# Patient Record
Sex: FEMALE | Race: AMERICAN INDIAN OR ALASKA NATIVE
[De-identification: names, ages, dates, MRNs, and addresses within clinical notes are randomized per-mention and may not be internally consistent; named-entity substitution may affect disease eponyms.]

---

## 2017-04-20 ENCOUNTER — HOSPITAL ENCOUNTER (EMERGENCY)
Dept: HOSPITAL 5 - ED | Age: 32
Discharge: HOME | End: 2017-04-20
Payer: SELF-PAY

## 2017-04-20 VITALS — DIASTOLIC BLOOD PRESSURE: 88 MMHG | SYSTOLIC BLOOD PRESSURE: 127 MMHG

## 2017-04-20 DIAGNOSIS — M54.5: Primary | ICD-10-CM

## 2017-04-20 DIAGNOSIS — R10.9: ICD-10-CM

## 2017-04-20 LAB
ALBUMIN SERPL-MCNC: 4.1 G/DL (ref 3.9–5)
ALBUMIN/GLOB SERPL: 1.3 %
ALP SERPL-CCNC: 60 UNITS/L (ref 35–129)
ALT SERPL-CCNC: 11 UNITS/L (ref 7–56)
ANION GAP SERPL CALC-SCNC: 16 MMOL/L
BASOPHILS NFR BLD AUTO: 0.7 % (ref 0–1.8)
BILIRUB SERPL-MCNC: 0.3 MG/DL (ref 0.1–1.2)
BILIRUB UR QL STRIP: (no result)
BLOOD UR QL VISUAL: (no result)
BUN SERPL-MCNC: 12 MG/DL (ref 7–17)
BUN/CREAT SERPL: 13.33 %
CALCIUM SERPL-MCNC: 9 MG/DL (ref 8.4–10.2)
CHLORIDE SERPL-SCNC: 98.4 MMOL/L (ref 98–107)
CO2 SERPL-SCNC: 25 MMOL/L (ref 22–30)
EOSINOPHIL NFR BLD AUTO: 3.4 % (ref 0–4.3)
GLUCOSE SERPL-MCNC: 92 MG/DL (ref 65–100)
HCT VFR BLD CALC: 36.5 % (ref 30.3–42.9)
HGB BLD-MCNC: 12.2 GM/DL (ref 10.1–14.3)
KETONES UR STRIP-MCNC: (no result) MG/DL
LEUKOCYTE ESTERASE UR QL STRIP: (no result)
LIPASE SERPL-CCNC: 20 UNITS/L (ref 13–60)
MCH RBC QN AUTO: 32 PG (ref 28–32)
MCHC RBC AUTO-ENTMCNC: 33 % (ref 30–34)
MCV RBC AUTO: 96 FL (ref 79–97)
NITRITE UR QL STRIP: (no result)
PH UR STRIP: 8 [PH] (ref 5–7)
PLATELET # BLD: 173 K/MM3 (ref 140–440)
POTASSIUM SERPL-SCNC: 4.4 MMOL/L (ref 3.6–5)
PROT SERPL-MCNC: 7.2 G/DL (ref 6.3–8.2)
PROT UR STRIP-MCNC: (no result) MG/DL
RBC # BLD AUTO: 3.82 M/MM3 (ref 3.65–5.03)
RBC #/AREA URNS HPF: 3 /HPF (ref 0–6)
SODIUM SERPL-SCNC: 135 MMOL/L (ref 137–145)
UROBILINOGEN UR-MCNC: < 2 MG/DL (ref ?–2)
WBC # BLD AUTO: 6.7 K/MM3 (ref 4.5–11)
WBC #/AREA URNS HPF: < 1 /HPF (ref 0–6)

## 2017-04-20 PROCEDURE — 81001 URINALYSIS AUTO W/SCOPE: CPT

## 2017-04-20 PROCEDURE — 83690 ASSAY OF LIPASE: CPT

## 2017-04-20 PROCEDURE — 81025 URINE PREGNANCY TEST: CPT

## 2017-04-20 PROCEDURE — 80053 COMPREHEN METABOLIC PANEL: CPT

## 2017-04-20 PROCEDURE — 85025 COMPLETE CBC W/AUTO DIFF WBC: CPT

## 2017-04-20 PROCEDURE — 36415 COLL VENOUS BLD VENIPUNCTURE: CPT

## 2017-04-20 PROCEDURE — 99283 EMERGENCY DEPT VISIT LOW MDM: CPT

## 2017-04-20 NOTE — EMERGENCY DEPARTMENT REPORT
ED Abdominal Pain HPI





- General


Chief Complaint: Back Pain/Injury


Stated Complaint: LOWER BACK PAIN


Time Seen by Provider: 17 21:55


Source: patient


Mode of arrival: Ambulatory


Limitations: No Limitations





- History of Present Illness


Initial Comments: 





She is a 31-year-old female with no past medical history presenting with lower 

back pain greater than one month.  Patient reports 4 weeks ago she noticed 

increasing soreness in the lower back.  Patient describes the pain as 

intermittent, dull, ache.  Associated right lower quadrant pain at times.  

Currently patient denies any abdominal pain.  Patient denies any inciting 

injury.  Otherwise no fevers, chills, nausea, vomiting, chest pain, shortness 

of breath, trauma, travel,  sick contacts, difficulty with gait, urinary or 

bowel incontinence.


MD Complaint: abdominal pain, other (back pain)





- Related Data


 Previous Rx's











 Medication  Instructions  Recorded  Last Taken  Type


 


Naproxen [Naprosyn] 500 mg PO BID PRN #14 tablet 17 Unknown Rx











 Allergies











Allergy/AdvReac Type Severity Reaction Status Date / Time


 


No Known Allergies Allergy   Unverified 10/06/13 18:11














ED Review of Systems


ROS: 


Stated complaint: LOWER BACK PAIN


Other details as noted in HPI





Comment: All other systems reviewed and negative





ED Past Medical Hx





- Past Medical History


Previous Medical History?: No





- Surgical History


Past Surgical History?: Yes


Additional Surgical History:  





- Social History


Smoking Status: Former Smoker


Substance Use Type: None





- Medications


Home Medications: 


 Home Medications











 Medication  Instructions  Recorded  Confirmed  Last Taken  Type


 


Naproxen [Naprosyn] 500 mg PO BID PRN #14 tablet 17  Unknown Rx














ED Physical Exam





- General


Limitations: No Limitations


General appearance: alert, in no apparent distress





- Head


Head exam: Present: atraumatic, normocephalic





- Eye


Eye exam: Present: normal appearance





- ENT


ENT exam: Present: mucous membranes moist





- Neck


Neck exam: Present: normal inspection





- Respiratory


Respiratory exam: Present: normal lung sounds bilaterally.  Absent: respiratory 

distress





- Cardiovascular


Cardiovascular Exam: Present: regular rate, normal rhythm.  Absent: systolic 

murmur, diastolic murmur, rubs, gallop





- GI/Abdominal


GI/Abdominal exam: Present: soft, normal bowel sounds





- Extremities Exam


Extremities exam: Present: normal inspection





- Back Exam


Back exam: Present: normal inspection, full ROM.  Absent: tenderness, CVA 

tenderness (R), muscle spasm, paraspinal tenderness, vertebral tenderness





- Neurological Exam


Neurological exam: Present: alert, oriented X3, CN II-XII intact, normal gait.  

Absent: motor sensory deficit





- Psychiatric


Psychiatric exam: Present: normal affect, normal mood





- Skin


Skin exam: Present: warm, dry, intact, normal color.  Absent: rash





ED Course


 Vital Signs











  17





  17:54 22:12 22:13


 


Temperature 98.8 F 98 F 


 


Pulse Rate 87 80 


 


Respiratory 18 18 





Rate   


 


Blood Pressure 113/76  


 


Blood Pressure  108/60 





[Left]   


 


O2 Sat by Pulse 100 98 100





Oximetry   














ED Medical Decision Making





- Lab Data


Result diagrams: 


 17 18:02





 17 18:02


Critical care attestation.: 


If time is entered above; I have spent that time in minutes in the direct care 

of this critically ill patient, excluding procedure time.








ED Disposition


Clinical Impression: 


 Back pain, Abdominal pain





Disposition: DISCHARGED TO HOME OR SELFCARE


Is pt being admited?: No


Condition: Stable


Instructions:  Abdominal Pain (ED), Low Back Strain (ED)


Prescriptions: 


Naproxen [Naprosyn] 500 mg PO BID PRN #14 tablet


 PRN Reason: Pain


Referrals: 


PRIMARY CARE,MD [Primary Care Provider] - 3-5 Days

## 2018-05-13 ENCOUNTER — HOSPITAL ENCOUNTER (EMERGENCY)
Dept: HOSPITAL 5 - ED | Age: 33
Discharge: HOME | End: 2018-05-13
Payer: COMMERCIAL

## 2018-05-13 VITALS — DIASTOLIC BLOOD PRESSURE: 75 MMHG | SYSTOLIC BLOOD PRESSURE: 110 MMHG

## 2018-05-13 DIAGNOSIS — S61.216A: Primary | ICD-10-CM
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
## 2018-05-13 DIAGNOSIS — F17.200: ICD-10-CM
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
## 2018-05-13 DIAGNOSIS — S91.011A: ICD-10-CM
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
## 2018-05-13 NOTE — XRAY REPORT
FINAL REPORT



PROCEDURE:  XR FINGER(S) 2+V RT



TECHNIQUE:  RIGHT hand radiographs, AP, lateral, and oblique

views. CPT [id number]







HISTORY:  s/p fall r/o glass in finger 



COMPARISON:  No prior studies are available for comparison.



FINDINGS:  

Fracture (s) and/or Dislocation(s): None .



Alignment: Normal .



Joint space(s): Normal .



Soft tissues: Normal .



Bone mineralization: Normal .



Foreign bodies: None .







IMPRESSION:  

Normal Examination .

## 2018-05-13 NOTE — XRAY REPORT
FINAL REPORT



PROCEDURE:  XR TIBIA FIBULA 2V RT



TECHNIQUE:  RIGHT tibia and fibula radiographs, AP and lateral

views. CPT 79467







HISTORY:  S/P fall R/O glass in wounds 



COMPARISON:  No prior studies are available for comparison.



FINDINGS:  

Fracture (s) and/or Dislocation(s): None .



Joint space(s): Normal .



Soft tissues: Normal .



Bone mineralization: Normal .



Foreign bodies: None .







IMPRESSION:  

Normal Examination.

## 2018-05-13 NOTE — EMERGENCY DEPARTMENT REPORT
- General


Chief Complaint: Laceration/Recheck/Suture


Stated Complaint: RIGHT LEG LACERATION


Time Seen by Provider: 18 03:13


Source: patient


Mode of arrival: Ambulatory


Limitations: No Limitations





- History of Present Illness


Initial Comments: 





32-year-old -American female comes in status post fall in the shower 

glass broken.  Patient reports multiple cuts to her right lower extremity and 

right pinkie.  Patient reports that she is up-to-date in her tetanus has no 

past medical history currently takes prenatal vitamins for prophylactic 

possibility of trying to get pregnant.  No known drug allergies.  She is here 

with her mother and her sister.


-: During the night


Extremity Location: Right: Lower Leg, Ankle


Place: home


Patient Tetanus UTD: Yes


Context: accidental


Associated Symptoms: none





- Related Data


 Previous Rx's











 Medication  Instructions  Recorded  Last Taken  Type


 


Naproxen [Naprosyn TAB] 500 mg PO BID PRN #14 tablet 18 Unknown Rx


 


Sulfamethoxazole/Trimethoprim 1 each PO BID #20 tablet 18 Unknown Rx





[Bactrim Ds Tablet]    











 Allergies











Allergy/AdvReac Type Severity Reaction Status Date / Time


 


No Known Allergies Allergy   Unverified 10/06/13 18:11














ED Review of Systems


ROS: 


Stated complaint: RIGHT LEG LACERATION


Other details as noted in HPI





Comment: All other systems reviewed and negative


Genitourinary: denies: urgency, dysuria, discharge


Musculoskeletal: denies: back pain, joint swelling, arthralgia


Skin: other (multiple abrasion laceration on right finger and her right ankle)


Neurological: denies: headache, weakness, paresthesias


Psychiatric: denies: anxiety, depression


Hematological/Lymphatic: denies: easy bleeding, easy bruising





ED Past Medical Hx





- Past Medical History


Previous Medical History?: No





- Surgical History


Past Surgical History?: Yes


Additional Surgical History:  





- Social History


Smoking Status: Current Every Day Smoker


Substance Use Type: Alcohol





- Medications


Home Medications: 


 Home Medications











 Medication  Instructions  Recorded  Confirmed  Last Taken  Type


 


Naproxen [Naprosyn TAB] 500 mg PO BID PRN #14 tablet 18  Unknown Rx


 


Sulfamethoxazole/Trimethoprim 1 each PO BID #20 tablet 18  Unknown Rx





[Bactrim Ds Tablet]     














ED Physical Exam





- General


Limitations: No Limitations


General appearance: alert, in no apparent distress





- Head


Head exam: Present: atraumatic, normocephalic





- Eye


Eye exam: Present: normal appearance





- ENT


ENT exam: Present: mucous membranes moist





- Cardiovascular


Cardiovascular Exam: Present: regular rate





- Neurological Exam


Neurological exam: Present: alert, oriented X3





- Psychiatric


Psychiatric exam: Present: normal affect, normal mood





- Skin


Skin exam: Present: abrasion (multiple foreign right leg and lower leg,), other 

(2 cm laceration to the right lateral malleolus that is jagged with a flap, 1 

cm laceration to the right PIP of the fifth digit)





ED Course


 Vital Signs











  18





  02:04


 


Temperature 98.5 F


 


Pulse Rate 100 H


 


Respiratory 20





Rate 


 


Blood Pressure 110/75


 


O2 Sat by Pulse 97





Oximetry 














- Laceration /Wound Repair


  ** Right Finger


Wound Location: upper extremity (1 cm laceration to the fifth DIP right hand), 

lower extremity (2 cm laceration with a flap and irregular to the right lateral 

malleolus)


Wound's Depth, Shape: superficial


Wound Explored: clean


Irrigated w/ Saline (ccs): 500


Betadine Prep?: Yes (500 mL but soak, finger soak 50 mL with Betadine)


Anesthesia: 1% Lidocaine


Volume Anesthetic (ccs): 5 (for both lacerations)


Wound Debrided: moderate


Wound Repaired With: sutures


Suture Size/Type: 5:0, proline


Number of Sutures: 6 (4 to the right fifth digit due to the right malleolus 

ankle)


Layer Closure?: No


Sterile Dressing Applied?: Yes


Progress: 





Patient tolerated procedure well





ED Medical Decision Making





- Radiology Data


Radiology results: report reviewed, image reviewed





FINDINGS: 


 Fracture (s) and/or Dislocation(s): None . 





 Alignment: Normal . 





 Joint space(s): Normal . 





 Soft tissues: Normal . 





 Bone mineralization: Normal . 





 Foreign bodies: None . 











 IMPRESSION: 


 Normal Examination . 











 Transcribed By: [facility] 


 Dictated By: FLACO SR MD 


 Electronically Authenticated By: FLACO SR MD 


 Signed Date/Time: 18 











 DD/DT: 18 


 TD/TT: 18 











FINAL REPORT 





 PROCEDURE: XR TIBIA FIBULA 2V RT 





 TECHNIQUE: RIGHT tibia and fibula radiographs, AP and lateral 


 views. CPT 03938 











 HISTORY: S/P fall R/O glass in wounds 





 COMPARISON: No prior studies are available for comparison. 





 FINDINGS: 


 Fracture (s) and/or Dislocation(s): None . 





 Joint space(s): Normal . 





 Soft tissues: Normal . 





 Bone mineralization: Normal . 





 Foreign bodies: None . 











 IMPRESSION: 


 Normal Examination. 











 Transcribed By: [facility] 


 Dictated By: FLACO SR MD 


 Electronically Authenticated By: FLACO SR MD 


 Signed Date/Time: 18 











 DD/DT: 18 


 TD/TT: 18 





- Medical Decision Making





Patient has been evaluated by this provider fast track.  Patient had right 

finger sutured and right ankle sutured.  Discussed the patient she needs to 

return back to the emergency room in 7-10 days for suture removal.  Discussed 

with patient to continue with the antibiotics that I have prescribed and pain 

medication as prescribed.  She is to return sooner if there is any signs of 

infection such as purulent discharge swelling redness.  Please change her 

dressings daily.  He area clean and dry.


Critical care attestation.: 


If time is entered above; I have spent that time in minutes in the direct care 

of this critically ill patient, excluding procedure time.








ED Disposition


Clinical Impression: 


Laceration of right ankle


Qualifiers:


 Encounter type: initial encounter Qualified Code(s): S91.011A - Laceration 

without foreign body, right ankle, initial encounter





Laceration of right little finger


Qualifiers:


 Encounter type: initial encounter Damage to nail status: without damage 

Foreign body presence: without foreign body Qualified Code(s): S61.216A - 

Laceration without foreign body of right little finger without damage to nail, 

initial encounter





Abrasion of multiple sites of right lower extremity


Qualifiers:


 Encounter type: initial encounter Qualified Code(s): S80.811A - Abrasion, 

right lower leg, initial encounter





Disposition: - TO HOME OR SELFCARE


Is pt being admited?: No


Does the pt Need Aspirin: No


Condition: Stable


Instructions:  Suture Care (ED), Laceration (ED), Skin Adhesive Care (ED)


Additional Instructions: 


Please keep sutures clean and dry.  Please complete antibiotics as prescribed.  

Please follow up for suture removal in 7-10 days.  You can take naproxen for 

pain.


Prescriptions: 


Naproxen [Naprosyn TAB] 500 mg PO BID PRN #14 tablet


 PRN Reason: Pain


Sulfamethoxazole/Trimethoprim [Bactrim Ds Tablet] 1 each PO BID #20 tablet


Referrals: 


LUIZ RODRIGUEZ MD [Primary Care Provider] - 3-5 Days


Forms:  Work/School Release Form(ED), Accompanied Note

## 2018-05-16 ENCOUNTER — HOSPITAL ENCOUNTER (EMERGENCY)
Dept: HOSPITAL 5 - ED | Age: 33
Discharge: HOME | End: 2018-05-16
Payer: COMMERCIAL

## 2018-05-16 VITALS — DIASTOLIC BLOOD PRESSURE: 76 MMHG | SYSTOLIC BLOOD PRESSURE: 122 MMHG

## 2018-05-16 DIAGNOSIS — F17.200: ICD-10-CM

## 2018-05-16 DIAGNOSIS — M25.571: Primary | ICD-10-CM

## 2018-05-16 NOTE — XRAY REPORT
Right ankle 3 views:



History: Ankle and foot pain.



Findings:



Soft tissue swelling lateral malleolus. No fracture or dislocation. 

Small spur posterior inferior calcaneum.



Impression:



Soft tissue swelling. No evidence of acute fracture.

## 2018-05-16 NOTE — EMERGENCY DEPARTMENT REPORT
ED Lower Extremity HPI





- General


Chief Complaint: Extremity Injury, Lower


Stated Complaint: FOOT PAIN


Time Seen by Provider: 18 12:07


Source: patient


Mode of arrival: Ambulatory


Limitations: No Limitations





- History of Present Illness


Initial Comments: 





This is a 33-year-old female nontoxic, well nourished in appearance, no acute 

signs of distress presents to the ED with c/o of right ankle and foot pain. 

Patient stated she was seen by a provider by 3 days ago and received multiple 

sutures to the area. Patient stated she started to take Bactrim but only has 

taken 2 pills of it and stopped. Patient stated swelling increased and pain. 

Patient denies any pus, drainage, fever, chills, headache, nausea, vomiting, 

chest pain, shortness of breathe, back pain, numbness or tingling. Patient 

denies any new trauma. Patient denies any allergies or PMH.


MD Complaint: ankle injury, foot injury


-: days(s) (3)


Injury: Ankle: Left, Foot: Left


Place: home


Severity: mild


Severity scale (0 -10): 8


Improves With: immobilization


Worsens With: movement, palpation


Associated Symptoms: swelling, able to partially bear weight, ambulatory.  

denies: snap/pop sensation, numbness, tingling, unable to bear weight





- Related Data


 Previous Rx's











 Medication  Instructions  Recorded  Last Taken  Type


 


Naproxen [Naprosyn TAB] 500 mg PO BID PRN #14 tablet 18 Unknown Rx


 


Sulfamethoxazole/Trimethoprim 1 each PO BID #20 tablet 18 Unknown Rx





[Bactrim Ds Tablet]    


 


Clindamycin [Clindamycin CAP] 300 mg PO Q8H 7 Days  cap 18 Unknown Rx


 


Ibuprofen [Motrin] 600 mg PO Q8H PRN #30 tablet 18 Unknown Rx











 Allergies











Allergy/AdvReac Type Severity Reaction Status Date / Time


 


No Known Allergies Allergy   Unverified 10/06/13 18:11














ED Review of Systems


ROS: 


Stated complaint: FOOT PAIN


Other details as noted in HPI





Constitutional: denies: chills, fever


Eyes: denies: eye pain, eye discharge, vision change


ENT: denies: ear pain, throat pain


Respiratory: denies: cough, shortness of breath, wheezing


Cardiovascular: denies: chest pain, palpitations


Endocrine: no symptoms reported


Gastrointestinal: denies: abdominal pain, nausea, diarrhea


Genitourinary: denies: urgency, dysuria, discharge


Musculoskeletal: joint swelling, arthralgia.  denies: back pain


Skin: denies: rash, lesions


Neurological: denies: headache, weakness, paresthesias


Psychiatric: denies: anxiety, depression


Hematological/Lymphatic: denies: easy bleeding, easy bruising





ED Past Medical Hx





- Past Medical History


Previous Medical History?: Yes


Additional medical history: THYROID PROBLEMS AND NON COMPLIANT WITH THYROID MEDS





- Surgical History


Past Surgical History?: Yes


Additional Surgical History:  





- Social History


Smoking Status: Current Every Day Smoker


Substance Use Type: Alcohol, Prescribed





- Medications


Home Medications: 


 Home Medications











 Medication  Instructions  Recorded  Confirmed  Last Taken  Type


 


Naproxen [Naprosyn TAB] 500 mg PO BID PRN #14 tablet 18  Unknown Rx


 


Sulfamethoxazole/Trimethoprim 1 each PO BID #20 tablet 18  Unknown Rx





[Bactrim Ds Tablet]     


 


Clindamycin [Clindamycin CAP] 300 mg PO Q8H 7 Days  cap 18  Unknown Rx


 


Ibuprofen [Motrin] 600 mg PO Q8H PRN #30 tablet 18  Unknown Rx














ED Physical Exam





- General


Limitations: No Limitations


General appearance: alert, in no apparent distress





- Head


Head exam: Present: atraumatic, normocephalic





- Eye


Eye exam: Present: normal appearance


Pupils: Present: normal accommodation





- ENT


ENT exam: Present: normal exam, mucous membranes moist





- Neck


Neck exam: Present: normal inspection, full ROM.  Absent: tenderness, 

meningismus, lymphadenopathy





- Respiratory


Respiratory exam: Present: normal lung sounds bilaterally.  Absent: respiratory 

distress, wheezes, rales, rhonchi, stridor, chest wall tenderness, accessory 

muscle use, decreased breath sounds, prolonged expiratory





- Cardiovascular


Cardiovascular Exam: Present: regular rate, normal rhythm, normal heart sounds.

  Absent: bradycardia, tachycardia, irregular rhythm, systolic murmur, 

diastolic murmur, rubs, gallop





- GI/Abdominal


GI/Abdominal exam: Present: soft, normal bowel sounds.  Absent: distended, 

tenderness, guarding, rebound, rigid, diminished bowel sounds





- Rectal


Rectal exam: Present: deferred





- Extremities Exam


Extremities exam: Present: normal inspection, full ROM, tenderness, normal 

capillary refill, pedal edema.  Absent: joint swelling, calf tenderness





- Expanded Lower Extremity Exam


  ** Right


Hip exam: Present: normal inspection, full ROM


Upper Leg exam: Present: normal inspection, full ROM


Knee exam: Present: normal inspection, full ROM


Lower Leg exam: Present: normal inspection, full ROM


Ankle exam: Present: normal inspection, full ROM, tenderness, swelling.  Absent

: abrasion, laceration, ecchymosis, deformity, crepidus, dislocation, erythema, 

anterior draw sign


Foot/Toe exam: Present: normal inspection, full ROM, tenderness, swelling.  

Absent: abrasion, laceration, ecchymosis, deformity, crepidus, dislocation, 

erythema, amputation, puncture wound, foreign body, calcaneal tenderness, 

tenderness at base of 5th metatarsal, nail avulsion, subungual hematoma


Neuro vascular tendon exam: Present: no vascular compromise.  Absent: pulse 

deficit, abnormal cap refill, motor deficit, sensory deficit, tendon deficit, 

extremity cold to touch, pallor, abnormal 2-point discrimination, decreased fine

/light touch, foot drop, peroneal nerve deficit, significant pain with passive 

ROM of distal joint


Gait: Positive: observed and limited by pain





- Back Exam


Back exam: Present: normal inspection, full ROM.  Absent: tenderness, CVA 

tenderness (R), CVA tenderness (L), muscle spasm, paraspinal tenderness, 

vertebral tenderness, rash noted





- Neurological Exam


Neurological exam: Present: alert, oriented X3, normal gait





- Psychiatric


Psychiatric exam: Present: normal affect, normal mood





- Skin


Skin exam: Present: warm, dry, intact, normal color.  Absent: rash





ED Course


 Vital Signs











  18





  08:57 12:20


 


Temperature 98.4 F 


 


Pulse Rate 85 


 


Respiratory 20 16





Rate  


 


Blood Pressure 120/76 


 


O2 Sat by Pulse 99 





Oximetry  














- Reevaluation(s)


Reevaluation #1: 





18 12:56


Patient is speaking in full sentences with no signs of distress noted.





ED Lower Extremity MDM





- Medical Decision Making





This is a 33-year-old male that presents with arthralgia to right foot/ankle.  

Patient is stable and was examined by me.  X-ray has been obtained and dictated 

by the radiologist.  Patient is notified of the x-ray report with noted by the 

patient.  Patient does have normal gait with slight tenderness to the lac site 

and no joint swelling.  No ecchymosis. no joint redness or swelling. Not warm 

to touch. No signs of cellulites present.  Patient was instructed to RICE 

therapy.  Patient received Motrin for pain.  Patient is discharged with 

Clindamycine and Motrin. She was instructed to discontinue bactrim and to take 

clindamycine instead. At time of discharge, the patient does not seem toxic or 

ill in appearance.  No acute signs of distress noted.  Patient agrees to 

discharge treatment plan of care.  No further questions noted by the patient.


Critical care attestation.: 


If time is entered above; I have spent that time in minutes in the direct care 

of this critically ill patient, excluding procedure time.








ED Disposition


Clinical Impression: 


Arthralgia


Qualifiers:


 Joint pain location: ankle Laterality: right Qualified Code(s): M25.571 - Pain 

in right ankle and joints of right foot





Disposition: - TO HOME OR SELFCARE


Is pt being admited?: No


Does the pt Need Aspirin: No


Condition: Stable


Instructions:  Clindamycin (By mouth), Ibuprofen (By mouth), RICE Therapy (ED)


Additional Instructions: 


Follow-up with a primary care doctor in 3-5 days or if symptoms worsen and 

continue return to emergency room as soon as possible. 


Prescriptions: 


Clindamycin [Clindamycin CAP] 300 mg PO Q8H 7 Days  cap


Ibuprofen [Motrin] 600 mg PO Q8H PRN #30 tablet


 PRN Reason: Pain


Referrals: 


PRIMARY CARE,MD [Primary Care Provider] - 3-5 Days


DAVID GIBBS MD [Staff Physician] - 3-5 Days


[facility] [Outside] - 3-5 Days


[facility] [Outside] - 3-5 Days


DAWOOD BAILEY MD [Staff Physician] - 3-5 Days

## 2018-05-16 NOTE — XRAY REPORT
Right foot 3 views:



History: Foot and ankle pain.



Findings:



No bony or articular abnormality. No fracture dislocation or periosteal 

reaction.



Impression:



No evidence of acute fracture.